# Patient Record
Sex: MALE | Race: WHITE | Employment: UNEMPLOYED | URBAN - METROPOLITAN AREA
[De-identification: names, ages, dates, MRNs, and addresses within clinical notes are randomized per-mention and may not be internally consistent; named-entity substitution may affect disease eponyms.]

---
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
## 2025-01-24 ENCOUNTER — TELEPHONE (OUTPATIENT)
Dept: PRIMARY CARE CLINIC | Age: 20
End: 2025-01-24

## 2025-05-27 ENCOUNTER — OFFICE VISIT (OUTPATIENT)
Dept: PRIMARY CARE CLINIC | Age: 20
End: 2025-05-27
Payer: COMMERCIAL

## 2025-05-27 VITALS
BODY MASS INDEX: 24.23 KG/M2 | HEART RATE: 65 BPM | WEIGHT: 163.6 LBS | OXYGEN SATURATION: 99 % | DIASTOLIC BLOOD PRESSURE: 88 MMHG | HEIGHT: 69 IN | SYSTOLIC BLOOD PRESSURE: 122 MMHG

## 2025-05-27 DIAGNOSIS — K40.90 INDIRECT LEFT INGUINAL HERNIA: Primary | ICD-10-CM

## 2025-05-27 PROCEDURE — G8420 CALC BMI NORM PARAMETERS: HCPCS | Performed by: STUDENT IN AN ORGANIZED HEALTH CARE EDUCATION/TRAINING PROGRAM

## 2025-05-27 PROCEDURE — G8427 DOCREV CUR MEDS BY ELIG CLIN: HCPCS | Performed by: STUDENT IN AN ORGANIZED HEALTH CARE EDUCATION/TRAINING PROGRAM

## 2025-05-27 PROCEDURE — 99213 OFFICE O/P EST LOW 20 MIN: CPT | Performed by: STUDENT IN AN ORGANIZED HEALTH CARE EDUCATION/TRAINING PROGRAM

## 2025-05-27 PROCEDURE — 1036F TOBACCO NON-USER: CPT | Performed by: STUDENT IN AN ORGANIZED HEALTH CARE EDUCATION/TRAINING PROGRAM

## 2025-05-27 SDOH — ECONOMIC STABILITY: FOOD INSECURITY: WITHIN THE PAST 12 MONTHS, THE FOOD YOU BOUGHT JUST DIDN'T LAST AND YOU DIDN'T HAVE MONEY TO GET MORE.: NEVER TRUE

## 2025-05-27 SDOH — ECONOMIC STABILITY: FOOD INSECURITY: WITHIN THE PAST 12 MONTHS, YOU WORRIED THAT YOUR FOOD WOULD RUN OUT BEFORE YOU GOT MONEY TO BUY MORE.: NEVER TRUE

## 2025-05-27 ASSESSMENT — PATIENT HEALTH QUESTIONNAIRE - PHQ9
SUM OF ALL RESPONSES TO PHQ QUESTIONS 1-9: 0
1. LITTLE INTEREST OR PLEASURE IN DOING THINGS: NOT AT ALL
SUM OF ALL RESPONSES TO PHQ QUESTIONS 1-9: 0
SUM OF ALL RESPONSES TO PHQ QUESTIONS 1-9: 0
2. FEELING DOWN, DEPRESSED OR HOPELESS: NOT AT ALL
SUM OF ALL RESPONSES TO PHQ QUESTIONS 1-9: 0

## 2025-05-27 NOTE — PATIENT INSTRUCTIONS
I have generated a referral for general surgery.  If you have not heard from them in 4 business days, you can call the number on the order sheet and schedule an appointment to get established. If you have any issues getting scheduled, please call the office to let me know.

## 2025-05-27 NOTE — PROGRESS NOTES
[facility]  [address] [place] [address].  [place] [address]  Phone: [phone]  Fax: [phone]    Office Visit Note    Date of Visit: 2025   Patient Name: Lennox Ojeda   Patient :  2005     ASSESSMENT/PLAN     1. Indirect left inguinal hernia  Assessment & Plan:  - Symptoms suggest left indirect inguinal hernia with intermittent pressure-type pain, more frequent when sitting or standing, subsiding when lying down  - No changes in bowel habits, no blood in stool, no pain during coughing or bowel movements. Normal physical exam  - referred to [facility] general surgeon   - Instructed to inform office if pain intensifies, persists beyond 10 minutes, swelling does not subside, pain becomes constant or sharp, or if experiencing difficulty defecating  Orders:  -     Roberto Iraheta MD - General Surgery, [place]      Patient Instructions   I have generated a referral for general surgery.  If you have not heard from them in 4 business days, you can call the number on the order sheet and schedule an appointment to get established. If you have any issues getting scheduled, please call the office to let me know.      Return if symptoms worsen or fail to improve.    COMMUNICATION   Questions/concerns answered. Patient verbalized and expressed understanding. Medications, laboratory testing, imaging, consultation, and follow up as documented in this encounter.     The patient (or guardian, if applicable) and other individuals in attendance with the patient were advised that Artificial Intelligence will be utilized during this visit to record, process the conversation to generate a clinical note and to support improvement of the AI technology. The patient (or guardian, if applicable) and other individuals in attendance at the appointment consented to the use of AI, including the recording.      An electronic signature was used to authenticate this note  - signed by Kavitha Taveras MD on 2025 at

## 2025-05-28 ENCOUNTER — TELEPHONE (OUTPATIENT)
Dept: BARIATRICS/WEIGHT MGMT | Age: 20
End: 2025-05-28

## 2025-05-28 DIAGNOSIS — K40.90 INGUINAL HERNIA WITHOUT OBSTRUCTION OR GANGRENE, RECURRENCE NOT SPECIFIED, UNSPECIFIED LATERALITY: Primary | ICD-10-CM

## 2025-05-28 NOTE — ASSESSMENT & PLAN NOTE
- Symptoms suggest left indirect inguinal hernia with intermittent pressure-type pain, more frequent when sitting or standing, subsiding when lying down  - No changes in bowel habits, no blood in stool, no pain during coughing or bowel movements. Normal physical exam  - referred to [facility] general surgeon   - Instructed to inform office if pain intensifies, persists beyond 10 minutes, swelling does not subside, pain becomes constant or sharp, or if experiencing difficulty defecating

## 2025-05-28 NOTE — TELEPHONE ENCOUNTER
Patient's mother called for a new referral for inguinal hernia. The referral was for Dr Peterson. I explained to her that Dr Peterson does not operate on inguinal hernias but Dr Clements does. I asked that the referral be changed by his pcp and to call us back

## 2025-05-28 NOTE — TELEPHONE ENCOUNTER
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    Please advise. Thank you.    Please call pt when referral is sent.

## 2025-06-11 ENCOUNTER — PREP FOR PROCEDURE (OUTPATIENT)
Dept: BARIATRICS/WEIGHT MGMT | Age: 20
End: 2025-06-11

## 2025-06-11 ENCOUNTER — INITIAL CONSULT (OUTPATIENT)
Dept: BARIATRICS/WEIGHT MGMT | Age: 20
End: 2025-06-11
Payer: COMMERCIAL

## 2025-06-11 VITALS
WEIGHT: 160 LBS | BODY MASS INDEX: 23.7 KG/M2 | OXYGEN SATURATION: 98 % | HEART RATE: 87 BPM | HEIGHT: 69 IN | SYSTOLIC BLOOD PRESSURE: 130 MMHG | DIASTOLIC BLOOD PRESSURE: 84 MMHG

## 2025-06-11 DIAGNOSIS — K40.20 NON-RECURRENT BILATERAL INGUINAL HERNIA WITHOUT OBSTRUCTION OR GANGRENE: Primary | ICD-10-CM

## 2025-06-11 PROBLEM — K40.90 INGUINAL HERNIA: Status: ACTIVE | Noted: 2025-06-11

## 2025-06-11 PROCEDURE — G8427 DOCREV CUR MEDS BY ELIG CLIN: HCPCS | Performed by: SURGERY

## 2025-06-11 PROCEDURE — 1036F TOBACCO NON-USER: CPT | Performed by: SURGERY

## 2025-06-11 PROCEDURE — G8420 CALC BMI NORM PARAMETERS: HCPCS | Performed by: SURGERY

## 2025-06-11 PROCEDURE — 99204 OFFICE O/P NEW MOD 45 MIN: CPT | Performed by: SURGERY

## 2025-06-16 ENCOUNTER — TELEPHONE (OUTPATIENT)
Dept: BARIATRICS/WEIGHT MGMT | Age: 20
End: 2025-06-16

## 2025-06-16 NOTE — TELEPHONE ENCOUNTER
Called and spoke with patient regarding upcoming surgery dates and times.  Patient verbalized understanding.

## 2025-06-19 RX ORDER — SODIUM CHLORIDE, SODIUM LACTATE, POTASSIUM CHLORIDE, CALCIUM CHLORIDE 600; 310; 30; 20 MG/100ML; MG/100ML; MG/100ML; MG/100ML
INJECTION, SOLUTION INTRAVENOUS CONTINUOUS
Status: CANCELLED | OUTPATIENT
Start: 2025-06-19

## 2025-06-19 NOTE — DISCHARGE INSTRUCTIONS
take, along with the dose of the medications and how often you take it.  If more convenient bring the pharmacy bottles in a zip lock bag.      Please shower the night before and the morning of surgery with an antibacterial soap.  Please use the wipes given to you the night before your surgery after your shower.  Unless otherwise told by your physician, please do not shave legs or any part of your body below your neck the night before or day of your surgery.  You may shave your face or neck.    Brush your teeth but do not swallow water.      Bring your inhaler if you are currently using one.    Bring your eyeglasses and case with you.  No contacts are to be worn the day of surgery.  You also may bring your hearing aids.      Bring your blood band if one has been given to you.  Please do not close the clasp.    If you are on C-PAP or Bi-PAP at home and plan on staying in the hospital overnight for your surgery please bring the machine with you.      Do not wear any jewelry or body piercings day of surgery.  Also, NO lotion, perfume or deodorant to be used the day of surgery.    Do not bring any valuables, such as jewelry, cash or credit cards.  If you are staying overnight with us, please bring a SMALL bag of personal items.  We cannot accommodate large items, like suitcases.      Please wear loose, comfortable clothing.  If you are potentially going to have a cast or brace bring clothing that will fit over them.                                                                                                                          In case of illness - If you have cold or flu like symptoms (high fever, runny nose, sore throat, cough, etc.) rash, nausea, vomiting, loose stools, and/or recent contact with someone who has a contagious disease (chicken pox, measles, etc.) Please call your doctor before coming to the hospital.      If your child is having surgery please make arrangements for any other children to be cared

## 2025-06-25 ENCOUNTER — TELEPHONE (OUTPATIENT)
Dept: BARIATRICS/WEIGHT MGMT | Age: 20
End: 2025-06-25

## 2025-06-25 NOTE — TELEPHONE ENCOUNTER
Patient called and states he needs to cancel surgery for 6/30/25 until fall/winter break at school.  Patient will call back when he gets his schedule to discuss new date.  Surgery and upcoming visits cancelled.

## 2025-06-26 ENCOUNTER — HOSPITAL ENCOUNTER (OUTPATIENT)
Dept: PREADMISSION TESTING | Age: 20
Discharge: HOME | End: 2025-06-26

## 2025-07-10 ENCOUNTER — OFFICE VISIT (OUTPATIENT)
Dept: PRIMARY CARE CLINIC | Age: 20
End: 2025-07-10
Payer: COMMERCIAL

## 2025-07-10 VITALS
BODY MASS INDEX: 23.67 KG/M2 | DIASTOLIC BLOOD PRESSURE: 84 MMHG | SYSTOLIC BLOOD PRESSURE: 122 MMHG | HEART RATE: 78 BPM | HEIGHT: 69 IN | WEIGHT: 159.8 LBS | OXYGEN SATURATION: 98 %

## 2025-07-10 DIAGNOSIS — K40.20 BILATERAL INGUINAL HERNIA WITHOUT OBSTRUCTION OR GANGRENE, RECURRENCE NOT SPECIFIED: ICD-10-CM

## 2025-07-10 DIAGNOSIS — E78.9 ABNORMAL CHOLESTEROL TEST: ICD-10-CM

## 2025-07-10 DIAGNOSIS — Z00.00 ENCOUNTER FOR WELL ADULT EXAM WITHOUT ABNORMAL FINDINGS: Primary | ICD-10-CM

## 2025-07-10 DIAGNOSIS — Z78.9 NEVER SMOKED TOBACCO: ICD-10-CM

## 2025-07-10 DIAGNOSIS — Z02.5 ROUTINE SPORTS PHYSICAL EXAM: ICD-10-CM

## 2025-07-10 PROCEDURE — 99395 PREV VISIT EST AGE 18-39: CPT | Performed by: STUDENT IN AN ORGANIZED HEALTH CARE EDUCATION/TRAINING PROGRAM

## 2025-07-10 NOTE — PROGRESS NOTES
[facility]  [address] [place] [address].  [place] [address]  Phone: [phone]  Fax: [phone]    Date of Visit: 7/10/2025   Patient Name: Lennox Ojeda   Patient :  2005     ASSESSMENT/PLAN     Lennox Ojeda is a 20 y.o. male who is here for a complete physical exam. Preventative screenings reviewed with patient today.     1. Encounter for well adult exam without abnormal findings  Comments:  - Advised HPV and meningococcal B vaccines, recommended but not mandatory  2. Routine sports physical exam  Comments:  - sports physical passed  - form completed and scanned into chart  3. Bilateral inguinal hernia without obstruction or gangrene, recurrence not specified  Assessment & Plan:  - Monitored by surgeon  - Discussed risks of incarceration and management options  - Surgery postponed to 2024 or 2025 based on patient's decision  4. Abnormal cholesterol test  Comments:  - repeat fasting lipid panel ordered  Orders:  -     Lipid, Fasting; Future  5. Never smoked tobacco       Patient Instructions   The lab here in the office is open on:  Mon-Thurs  07:30-3:45p (closed from 12:30-1p for lunch)  Friday   8-12p    The office and lab are closed on weekends.      Return in about 1 year (around 7/10/2026) for annual physical.    COMMUNICATION   Questions/concerns answered. Patient verbalized and expressed understanding.   - Risks and benefits of prostate cancer screening reviewed today in length       - Tobacco use reviewed and patient was educated on avoidance/cessation.  - Patient advised to eat a well balanced diet and educated on getting 150 minutes of moderate intensity physical activity per week  - Colonoscopy screening reviewed   - STD counseling and prevention reviewed.  - Medication directions and side effects discussed  - Medications, laboratory testing, imaging, consultation, and follow up as documented in this encounter.    Questions/concerns answered. Patient verbalized and expressed

## 2025-07-17 NOTE — ASSESSMENT & PLAN NOTE
- Monitored by surgeon  - Discussed risks of incarceration and management options  - Surgery postponed to 12/2024 or 05/2025 based on patient's decision